# Patient Record
Sex: FEMALE | Race: ASIAN | NOT HISPANIC OR LATINO | Employment: OTHER | URBAN - METROPOLITAN AREA
[De-identification: names, ages, dates, MRNs, and addresses within clinical notes are randomized per-mention and may not be internally consistent; named-entity substitution may affect disease eponyms.]

---

## 2017-01-25 ENCOUNTER — ALLSCRIPTS OFFICE VISIT (OUTPATIENT)
Dept: OTHER | Facility: OTHER | Age: 67
End: 2017-01-25

## 2017-01-25 DIAGNOSIS — Z12.31 ENCOUNTER FOR SCREENING MAMMOGRAM FOR MALIGNANT NEOPLASM OF BREAST: ICD-10-CM

## 2017-02-22 ENCOUNTER — ALLSCRIPTS OFFICE VISIT (OUTPATIENT)
Dept: OTHER | Facility: OTHER | Age: 67
End: 2017-02-22

## 2018-01-13 VITALS
RESPIRATION RATE: 16 BRPM | WEIGHT: 108.5 LBS | OXYGEN SATURATION: 100 % | BODY MASS INDEX: 23.41 KG/M2 | SYSTOLIC BLOOD PRESSURE: 114 MMHG | HEIGHT: 57 IN | HEART RATE: 74 BPM | TEMPERATURE: 96 F | DIASTOLIC BLOOD PRESSURE: 68 MMHG

## 2018-01-14 VITALS
SYSTOLIC BLOOD PRESSURE: 120 MMHG | WEIGHT: 106.25 LBS | BODY MASS INDEX: 22.92 KG/M2 | HEIGHT: 57 IN | HEART RATE: 94 BPM | TEMPERATURE: 97 F | RESPIRATION RATE: 16 BRPM | OXYGEN SATURATION: 99 % | DIASTOLIC BLOOD PRESSURE: 68 MMHG

## 2018-01-17 DIAGNOSIS — E03.9 HYPOTHYROIDISM: ICD-10-CM

## 2018-01-17 DIAGNOSIS — Z13.820 ENCOUNTER FOR SCREENING FOR OSTEOPOROSIS: ICD-10-CM

## 2018-01-17 DIAGNOSIS — Z78.9 OTHER SPECIFIED HEALTH STATUS (CODE): ICD-10-CM

## 2018-01-17 DIAGNOSIS — I10 ESSENTIAL (PRIMARY) HYPERTENSION: ICD-10-CM

## 2018-01-23 ENCOUNTER — ALLSCRIPTS OFFICE VISIT (OUTPATIENT)
Dept: OTHER | Facility: OTHER | Age: 68
End: 2018-01-23

## 2018-01-23 ENCOUNTER — GENERIC CONVERSION - ENCOUNTER (OUTPATIENT)
Dept: OTHER | Facility: OTHER | Age: 68
End: 2018-01-23

## 2018-01-24 ENCOUNTER — APPOINTMENT (OUTPATIENT)
Dept: LAB | Facility: CLINIC | Age: 68
End: 2018-01-24
Payer: MEDICARE

## 2018-01-24 DIAGNOSIS — Z78.9 OTHER SPECIFIED HEALTH STATUS (CODE): ICD-10-CM

## 2018-01-24 DIAGNOSIS — E03.9 HYPOTHYROIDISM: ICD-10-CM

## 2018-01-24 DIAGNOSIS — I10 ESSENTIAL (PRIMARY) HYPERTENSION: ICD-10-CM

## 2018-01-24 LAB
ALBUMIN SERPL BCP-MCNC: 3.8 G/DL (ref 3.5–5)
ALP SERPL-CCNC: 112 U/L (ref 46–116)
ALT SERPL W P-5'-P-CCNC: 17 U/L (ref 12–78)
ANION GAP SERPL CALCULATED.3IONS-SCNC: 7 MMOL/L (ref 4–13)
AST SERPL W P-5'-P-CCNC: 12 U/L (ref 5–45)
BASOPHILS # BLD AUTO: 0.04 THOUSANDS/ΜL (ref 0–0.1)
BASOPHILS NFR BLD AUTO: 1 % (ref 0–1)
BILIRUB SERPL-MCNC: 0.44 MG/DL (ref 0.2–1)
BUN SERPL-MCNC: 11 MG/DL (ref 5–25)
CALCIUM SERPL-MCNC: 9 MG/DL (ref 8.3–10.1)
CHLORIDE SERPL-SCNC: 102 MMOL/L (ref 100–108)
CHOLEST SERPL-MCNC: 180 MG/DL (ref 50–200)
CO2 SERPL-SCNC: 28 MMOL/L (ref 21–32)
CREAT SERPL-MCNC: 0.62 MG/DL (ref 0.6–1.3)
EOSINOPHIL # BLD AUTO: 0.18 THOUSAND/ΜL (ref 0–0.61)
EOSINOPHIL NFR BLD AUTO: 3 % (ref 0–6)
ERYTHROCYTE [DISTWIDTH] IN BLOOD BY AUTOMATED COUNT: 14.5 % (ref 11.6–15.1)
GFR SERPL CREATININE-BSD FRML MDRD: 95 ML/MIN/1.73SQ M
GLUCOSE P FAST SERPL-MCNC: 104 MG/DL (ref 65–99)
HCT VFR BLD AUTO: 35.6 % (ref 34.8–46.1)
HDLC SERPL-MCNC: 69 MG/DL (ref 40–60)
HGB BLD-MCNC: 12.1 G/DL (ref 11.5–15.4)
LDLC SERPL CALC-MCNC: 80 MG/DL (ref 0–100)
LYMPHOCYTES # BLD AUTO: 1.52 THOUSANDS/ΜL (ref 0.6–4.47)
LYMPHOCYTES NFR BLD AUTO: 25 % (ref 14–44)
MCH RBC QN AUTO: 29.3 PG (ref 26.8–34.3)
MCHC RBC AUTO-ENTMCNC: 34 G/DL (ref 31.4–37.4)
MCV RBC AUTO: 86 FL (ref 82–98)
MONOCYTES # BLD AUTO: 0.52 THOUSAND/ΜL (ref 0.17–1.22)
MONOCYTES NFR BLD AUTO: 9 % (ref 4–12)
NEUTROPHILS # BLD AUTO: 3.8 THOUSANDS/ΜL (ref 1.85–7.62)
NEUTS SEG NFR BLD AUTO: 62 % (ref 43–75)
NRBC BLD AUTO-RTO: 0 /100 WBCS
PLATELET # BLD AUTO: 350 THOUSANDS/UL (ref 149–390)
PMV BLD AUTO: 11.1 FL (ref 8.9–12.7)
POTASSIUM SERPL-SCNC: 3.8 MMOL/L (ref 3.5–5.3)
PROT SERPL-MCNC: 7.7 G/DL (ref 6.4–8.2)
RBC # BLD AUTO: 4.13 MILLION/UL (ref 3.81–5.12)
SODIUM SERPL-SCNC: 137 MMOL/L (ref 136–145)
TRIGL SERPL-MCNC: 155 MG/DL
TSH SERPL DL<=0.05 MIU/L-ACNC: 3.49 UIU/ML (ref 0.36–3.74)
VIT B12 SERPL-MCNC: 396 PG/ML (ref 100–900)
WBC # BLD AUTO: 6.07 THOUSAND/UL (ref 4.31–10.16)

## 2018-01-24 PROCEDURE — 36415 COLL VENOUS BLD VENIPUNCTURE: CPT

## 2018-01-24 PROCEDURE — 85025 COMPLETE CBC W/AUTO DIFF WBC: CPT

## 2018-01-24 PROCEDURE — 82607 VITAMIN B-12: CPT

## 2018-01-24 PROCEDURE — 80061 LIPID PANEL: CPT

## 2018-01-24 PROCEDURE — 80053 COMPREHEN METABOLIC PANEL: CPT

## 2018-01-24 PROCEDURE — 84443 ASSAY THYROID STIM HORMONE: CPT

## 2018-01-24 NOTE — PROGRESS NOTES
Assessment   1  Vegetarian (V49 89) (Z78 9)   2  Hypertension (401 9) (I10)   3  Hypothyroidism (244 9) (E03 9)    Plan   Cervical cancer screening    · *1 - Ethan Deleon    [phone]  Status: 2004 [place] [address] Summary provided  : Yes   · 1 - Anisa Carrizales  (Obstetrics/Gynecology) Physician Referral  Evaluation and    Treatment: the expectation is that the referred to provider will communicate back to the    patient on treatment options  Status: Canceled  Care Summary provided  : Yes  Colon cancer screening    · 1 - Jonathon LERMA, Kristel Tate  (General Surgery) Co-Management  *  Status: Active     Requested for: [id number]  Care Summary provided  : Yes   · COLONOSCOPY; Status:Active; Requested QRQ:[id number];   Encounter for screening mammogram for breast cancer    · * MAMMO SCREENING BILATERAL W CAD; Status:Active; Requested for:22Jan2018;   Hypertension    · From  AmLODIPine Besylate 5 MG Oral Tablet TAKE ONE TABLET BY MOUTH    DAILY FOR BLOOD PRESSURE To AmLODIPine Besylate 10 MG Oral Tablet TAKE 1    TABLET DAILY   · (1) CBC/PLT/DIFF; Status:Active; Requested OHS:[id number];    · (1) COMPREHENSIVE METABOLIC PANEL; Status:Active; Requested HQE:[id number];    · (1) LIPID PANEL, FASTING; Status:Active; Requested [facility]:[id number];   Hypothyroidism    · Levothyroxine Sodium 25 MCG Oral Tablet (Synthroid); TAKE 1 TABLET DAILY   · (1) TSH WITH FT4 REFLEX; Status:Active; Requested HPN:[id number];   Osteoporosis screening    · * DXA BONE DENSITY SPINE HIP AND PELVIS; Status:Active; Requested LGF:[id number];   Screening for genitourinary condition    · *VB - Urinary Incontinence Screen (Dx Z13 89 Screen for UI); Status:Active; Requested    NEB:[id number];   Screening for neurological condition    · *VB - Fall Risk Assessment  (Dx Z13 89 Screen for Neurologic Disorder); Status:Active; Requested [facility]:[id number];   SocHx: Vegetarian    · (1) VITAMIN B12; Status:Active;  Requested WVD:[id number]; Discussion/Summary      Due for labs for HTN and hypothyroidism  and influenza given today  Possible side effects of new medications were reviewed with the patient/guardian today  The treatment plan was reviewed with the patient/guardian  The patient/guardian understands and agrees with the treatment plan       Self Referrals: No      Chief Complaint   pt here today for f/u on HTN med refills and would like to have BW done to check thyroid states she takes  25MCG and Norvasc 10Mg      History of Present Illness   75 y/o F here for follow up of HTN and hypothyroidism  She does travel to [facility] once a year and had labs there last year which were normal  has had a pap smear done and it was normal in the past and she has elected not to get screened again since she was low risk  Last mammogram a few years ago  he has not had colonoscopy done  Review of Systems        Constitutional: No fever, no chills, feels well, no tiredness, no recent weight gain or weight loss  Eyes: No complaints of eye pain, no red eyes, no eyesight problems, no discharge, no dry eyes, no itching of eyes  ENT: no complaints of earache, no loss of hearing, no nose bleeds, no nasal discharge, no sore throat, no hoarseness  Cardiovascular: No complaints of slow heart rate, no fast heart rate, no chest pain, no palpitations, no leg claudication, no lower extremity edema  Respiratory: No complaints of shortness of breath, no wheezing, no cough, no SOB on exertion, no orthopnea, no PND  Gastrointestinal: No complaints of abdominal pain, no constipation, no nausea or vomiting, no diarrhea, no bloody stools  Genitourinary: No complaints of dysuria, no incontinence, no pelvic pain, no dysmenorrhea, no vaginal discharge or bleeding  Musculoskeletal: No complaints of arthralgias, no myalgias, no joint swelling or stiffness, no limb pain or swelling        Integumentary: No complaints of skin rash or lesions, no itching, no skin wounds, no breast pain or lump  Neurological: No complaints of headache, no confusion, no convulsions, no numbness, no dizziness or fainting, no tingling, no limb weakness, no difficulty walking  Psychiatric: Not suicidal, no sleep disturbance, no anxiety or depression, no change in personality, no emotional problems  Endocrine: No complaints of proptosis, no hot flashes, no muscle weakness, no deepening of the voice, no feelings of weakness  Hematologic/Lymphatic: No complaints of swollen glands, no swollen glands in the neck, does not bleed easily, does not bruise easily  Active Problems   1  Acute glaucoma of both eyes (365 22,365 70) (H40 9)   2  Cataract (366 9) (H26 9)   3  Cervical cancer screening (V76 2) (Z12 4)   4  Colon cancer screening (V76 51) (Z12 11)   5  Depression screening (V79 0) (Z13 89)   6  Encounter for screening mammogram for breast cancer (V76 12) (Z12 31)   7  Hypertension (401 9) (I10)   8  Hypothyroidism (244 9) (E03 9)   9  Osteoporosis screening (V82 81) (Z13 820)   10  Preop examination (V72 84) (Z01 818)   11  Screening for genitourinary condition (V81 6) (Z13 89)   12  Screening for neurological condition (V80 09) (Z13 89)   13  Visit for pre-operative examination (V72 84) (P62 008)    Past Medical History   1  History of glaucoma (V12 49) (Z86 69)   2  History of hypertension (V12 59) (Z86 79)   3  History of hypothyroidism (V12 29) (Z86 39)    Surgical History   1  History of Cataract Surgery     The surgical history was reviewed and updated today  Family History   Mother    1  Family history of hypertension (V17 49) (Z82 49)   2  Family history of thyroid disease (V18 19) (Z83 49)  Father    3  Family history of cerebral aneurysm (V17 1) (Z82 49)   4  Family history of diabetes mellitus (V18 0) (Z83 3)   5  Family history of hypertension (V17 49) (Z82 49)  Family History    6   Family history of Coronary Artery Disease (V17 49) The family history was reviewed and updated today  Social History    · Caregiver   · Does not use illicit drugs (B25 18) (Z78 9)   ·    · Never A Smoker   · No alcohol use   · No preference on Yazdanism beliefs   · Parent   · Retired   · Vegetarian (V49 89) (Z78 9)  The social history was reviewed and updated today  Current Meds    1  AmLODIPine Besylate 5 MG Oral Tablet; TAKE ONE TABLET BY MOUTH DAILY FOR     BLOOD PRESSURE; Therapy: 72AHE3700 to (Evaluate:14Jan2018)  Requested for: 12WWV0580; Last     Rx:[id number] Ordered   2  Levothyroxine Sodium 25 MCG Oral Tablet; TAKE 1 TABLET DAILY; Therapy: 66UDJ0181 to [phone])  Requested for: 82OBE0947; Last     Rx:25Jan2017 Ordered     The medication list was reviewed and updated today  Allergies   1  No Known Drug Allergies    Vitals   Vital Signs    Recorded: 51GYR4383 12:59PM   Temperature 97 6 F, Tympanic   Heart Rate 68   Respiration 18   Systolic 133, LUE, Sitting   Diastolic 66, LUE, Sitting   Height 4 ft 8 5 in   Weight 112 lb 9 oz   BMI Calculated 24 79   BSA Calculated 1 4     Physical Exam        Constitutional      General appearance: No acute distress, well appearing and well nourished  Eyes      Conjunctiva and lids: No swelling, erythema or discharge  Ears, Nose, Mouth, and Throat      External inspection of ears and nose: Normal        Otoscopic examination: Tympanic membranes translucent with normal light reflex  Canals patent without erythema  Pulmonary      Respiratory effort: No increased work of breathing or signs of respiratory distress  Auscultation of lungs: Clear to auscultation  Cardiovascular      Auscultation of heart: Normal rate and rhythm, normal S1 and S2, without murmurs  Examination of extremities for edema and/or varicosities: Normal        Lymphatic      Palpation of lymph nodes in neck: No lymphadenopathy         Musculoskeletal      Gait and station: Normal        Skin      Skin and subcutaneous tissue: Normal without rashes or lesions         Psychiatric      Orientation to person, place, and time: Normal        Mood and affect: Normal           Signatures    Electronically signed by : CONCHIS Agudelo; Jan 23 2018  1:30PM EST                       (Author)     Electronically signed by : DAVID Blanc ; Jan 23 2018  3:17PM EST

## 2018-04-16 ENCOUNTER — APPOINTMENT (OUTPATIENT)
Dept: BONE DENSITY | Facility: IMAGING CENTER | Age: 68
End: 2018-04-16
Payer: MEDICARE

## 2018-04-16 ENCOUNTER — HOSPITAL ENCOUNTER (OUTPATIENT)
Dept: BONE DENSITY | Facility: IMAGING CENTER | Age: 68
Discharge: HOME | End: 2018-04-16
Payer: MEDICARE

## 2018-04-16 DIAGNOSIS — Z12.31 ENCOUNTER FOR SCREENING MAMMOGRAM FOR MALIGNANT NEOPLASM OF BREAST: ICD-10-CM

## 2018-04-16 PROCEDURE — 77067 SCR MAMMO BI INCL CAD: CPT

## 2018-04-30 ENCOUNTER — HOSPITAL ENCOUNTER (OUTPATIENT)
Dept: BONE DENSITY | Facility: IMAGING CENTER | Age: 68
Discharge: HOME | End: 2018-04-30
Payer: MEDICARE

## 2018-04-30 DIAGNOSIS — Z13.820 ENCOUNTER FOR SCREENING FOR OSTEOPOROSIS: ICD-10-CM

## 2018-04-30 PROCEDURE — 77080 DXA BONE DENSITY AXIAL: CPT

## 2018-05-04 DIAGNOSIS — M81.0 OSTEOPOROSIS, UNSPECIFIED OSTEOPOROSIS TYPE, UNSPECIFIED PATHOLOGICAL FRACTURE PRESENCE: Primary | ICD-10-CM

## 2018-05-04 RX ORDER — ALENDRONATE SODIUM 70 MG/1
70 TABLET ORAL
Qty: 4 TABLET | Refills: 5 | Status: SHIPPED | OUTPATIENT
Start: 2018-05-04

## 2018-05-04 NOTE — PROGRESS NOTES
She has osteoporosis which is a weakness of the bones  I want her to start on fosamax once a week to help increase bone strength and I also recommend making sure she is getting at least 3 servings of calcium a day  She can take and OTC calcium supplement if she does not consume dairy regularly    When she takes the fosamax she should take it with a full glass of water and should avoid laying flat after for at least 30 minutes because it could cause inflammation in esphagus if she does not